# Patient Record
(demographics unavailable — no encounter records)

---

## 2025-06-10 NOTE — ASSESSMENT
[FreeTextEntry1] : IMP:  Dayna has a history of breast cancer. Completed RT and Tamoxifen  PLAN: [facility]/US 3/2026 Return in one year  All medical entries were at my, Dr. Herminio Reyes, direction. I have reviewed the chart and agree that the record accurately reflects my personal performance of the history, physical exam, assessment and plan. Our office Nurse Practitioner was present of the duration of the office visit.

## 2025-06-10 NOTE — PHYSICAL EXAM
[Normal Supraclavicular Lymph Nodes] : normal supraclavicular lymph nodes [Normal Axillary Lymph Nodes] : normal axillary lymph nodes [Normal] : normal lips, teeth and gums  [Normal] : supple, no neck mass and thyroid not enlarged [FreeTextEntry1] : SC present for exam  [de-identified] : Normal S1,S2. Regular rate and rhythm  [de-identified] : Complete breast exam performed in supine and upright position. No palpable masses, tenderness, nipple discharge, inversion, deviation or enlarged axillary or supraclavicular lymph nodes bilaterally.  [de-identified] : Clear breath sounds bilaterally with normal respiratory effort.

## 2025-06-10 NOTE — HISTORY OF PRESENT ILLNESS
[de-identified] : Ms. Dayna Jefferson is a 60 year old female who presents for a follow up visit for hx of breast cancer. She was a former patient of Dr. Larry Schilling.   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 Completed RT with Dr. Ayala. Continues with Dr. Encinas and has completed tamoxifen  B/L SM/US on 3/3/2025 shows no evidence of malignancy. BI-RADS 2

## 2025-06-10 NOTE — ASSESSMENT
[FreeTextEntry1] : IMP:  Dayna has a history of breast cancer. Completed RT and Tamoxifen  PLAN: [facility]/US 3/2026 Return in one year  All medical entries were at my, Dr. Herminio Reyes, direction. I have reviewed the chart and agree that the record accurately reflects my personal performance of the history, physical exam, assessment and plan. Our office Nurse Practitioner was present of the duration of the office visit.

## 2025-06-10 NOTE — HISTORY OF PRESENT ILLNESS
[de-identified] : Ms. Dayna Jefferson is a 60 year old female who presents for a follow up visit for hx of breast cancer. She was a former patient of Dr. Larry Schilling.   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 Completed RT with Dr. Ayala. Continues with Dr. Encinas and has completed tamoxifen  B/L SM/US on 3/3/2025 shows no evidence of malignancy. BI-RADS 2

## 2025-06-10 NOTE — PHYSICAL EXAM
[Normal Supraclavicular Lymph Nodes] : normal supraclavicular lymph nodes [Normal Axillary Lymph Nodes] : normal axillary lymph nodes [Normal] : normal lips, teeth and gums  [Normal] : supple, no neck mass and thyroid not enlarged [de-identified] : Normal S1,S2. Regular rate and rhythm  [FreeTextEntry1] : SC present for exam  [de-identified] : Complete breast exam performed in supine and upright position. No palpable masses, tenderness, nipple discharge, inversion, deviation or enlarged axillary or supraclavicular lymph nodes bilaterally.  [de-identified] : Clear breath sounds bilaterally with normal respiratory effort.